# Patient Record
(demographics unavailable — no encounter records)

---

## 2025-01-06 NOTE — HISTORY OF PRESENT ILLNESS
[FreeTextEntry6] : nery has been coughing and congested right before [place]. symptoms still lingering. has been taking mucinex but recently has forgotten to take. no fevers.  yellow mucous when he coughs it out. experiencing headaches every day. OX: 97%

## 2025-01-06 NOTE — PHYSICAL EXAM
[Tenderness] : no tenderness [Mucoid Discharge] : mucoid discharge [Inflamed Nasal Mucosa] : inflamed nasal mucosa [Hypertrophied Nasal Mucosa] : hypertrophied nasal mucosa [NL] : regular rate and rhythm, normal S1, S2 audible, no murmurs